# Patient Record
Sex: MALE | Race: ASIAN | NOT HISPANIC OR LATINO | URBAN - METROPOLITAN AREA
[De-identification: names, ages, dates, MRNs, and addresses within clinical notes are randomized per-mention and may not be internally consistent; named-entity substitution may affect disease eponyms.]

---

## 2017-11-24 ENCOUNTER — OUTPATIENT (OUTPATIENT)
Dept: OUTPATIENT SERVICES | Facility: HOSPITAL | Age: 52
LOS: 1 days | End: 2017-11-24
Payer: COMMERCIAL

## 2017-11-24 DIAGNOSIS — R07.9 CHEST PAIN, UNSPECIFIED: ICD-10-CM

## 2017-11-24 PROCEDURE — 78452 HT MUSCLE IMAGE SPECT MULT: CPT

## 2017-11-24 PROCEDURE — A9502: CPT

## 2017-11-24 PROCEDURE — 93017 CV STRESS TEST TRACING ONLY: CPT

## 2017-11-24 PROCEDURE — 93018 CV STRESS TEST I&R ONLY: CPT

## 2017-11-24 PROCEDURE — 78452 HT MUSCLE IMAGE SPECT MULT: CPT | Mod: 26

## 2017-11-24 PROCEDURE — 93016 CV STRESS TEST SUPVJ ONLY: CPT

## 2018-01-23 ENCOUNTER — APPOINTMENT (OUTPATIENT)
Dept: RADIOLOGY | Facility: IMAGING CENTER | Age: 53
End: 2018-01-23
Payer: COMMERCIAL

## 2018-01-23 ENCOUNTER — OUTPATIENT (OUTPATIENT)
Dept: OUTPATIENT SERVICES | Facility: HOSPITAL | Age: 53
LOS: 1 days | End: 2018-01-23
Payer: COMMERCIAL

## 2018-01-23 DIAGNOSIS — Z00.8 ENCOUNTER FOR OTHER GENERAL EXAMINATION: ICD-10-CM

## 2018-01-23 PROCEDURE — 73130 X-RAY EXAM OF HAND: CPT

## 2018-01-23 PROCEDURE — 73130 X-RAY EXAM OF HAND: CPT | Mod: 26,LT

## 2018-02-05 ENCOUNTER — APPOINTMENT (OUTPATIENT)
Dept: ORTHOPEDIC SURGERY | Facility: CLINIC | Age: 53
End: 2018-02-05

## 2018-05-24 ENCOUNTER — APPOINTMENT (OUTPATIENT)
Dept: VASCULAR SURGERY | Facility: CLINIC | Age: 53
End: 2018-05-24
Payer: COMMERCIAL

## 2018-05-24 VITALS
HEIGHT: 71 IN | HEART RATE: 97 BPM | TEMPERATURE: 98 F | BODY MASS INDEX: 35 KG/M2 | SYSTOLIC BLOOD PRESSURE: 131 MMHG | WEIGHT: 250 LBS | DIASTOLIC BLOOD PRESSURE: 90 MMHG

## 2018-05-24 PROCEDURE — 93970 EXTREMITY STUDY: CPT

## 2018-05-24 PROCEDURE — 99212 OFFICE O/P EST SF 10 MIN: CPT

## 2018-08-02 ENCOUNTER — APPOINTMENT (OUTPATIENT)
Dept: ORTHOPEDIC SURGERY | Facility: CLINIC | Age: 53
End: 2018-08-02
Payer: COMMERCIAL

## 2018-08-02 VITALS
BODY MASS INDEX: 35.29 KG/M2 | WEIGHT: 253 LBS | HEART RATE: 103 BPM | SYSTOLIC BLOOD PRESSURE: 131 MMHG | DIASTOLIC BLOOD PRESSURE: 89 MMHG

## 2018-08-02 PROCEDURE — 72170 X-RAY EXAM OF PELVIS: CPT

## 2018-08-02 PROCEDURE — 73562 X-RAY EXAM OF KNEE 3: CPT | Mod: 50

## 2018-08-02 PROCEDURE — 99203 OFFICE O/P NEW LOW 30 MIN: CPT

## 2018-10-17 ENCOUNTER — APPOINTMENT (OUTPATIENT)
Dept: RADIOLOGY | Facility: IMAGING CENTER | Age: 53
End: 2018-10-17
Payer: COMMERCIAL

## 2018-10-17 ENCOUNTER — OUTPATIENT (OUTPATIENT)
Dept: OUTPATIENT SERVICES | Facility: HOSPITAL | Age: 53
LOS: 1 days | End: 2018-10-17
Payer: COMMERCIAL

## 2018-10-17 DIAGNOSIS — Z00.8 ENCOUNTER FOR OTHER GENERAL EXAMINATION: ICD-10-CM

## 2018-10-17 PROCEDURE — 71046 X-RAY EXAM CHEST 2 VIEWS: CPT | Mod: 26

## 2018-10-17 PROCEDURE — 71046 X-RAY EXAM CHEST 2 VIEWS: CPT

## 2018-10-19 ENCOUNTER — EMERGENCY (EMERGENCY)
Facility: HOSPITAL | Age: 53
LOS: 1 days | Discharge: HOME | End: 2018-10-19
Attending: EMERGENCY MEDICINE | Admitting: EMERGENCY MEDICINE
Payer: COMMERCIAL

## 2018-10-19 VITALS
TEMPERATURE: 98 F | HEART RATE: 78 BPM | OXYGEN SATURATION: 100 % | RESPIRATION RATE: 16 BRPM | SYSTOLIC BLOOD PRESSURE: 143 MMHG | DIASTOLIC BLOOD PRESSURE: 80 MMHG

## 2018-10-19 PROCEDURE — 73564 X-RAY EXAM KNEE 4 OR MORE: CPT | Mod: 26,RT

## 2018-10-19 PROCEDURE — 99283 EMERGENCY DEPT VISIT LOW MDM: CPT

## 2018-10-19 RX ORDER — IBUPROFEN 200 MG
600 TABLET ORAL ONCE
Qty: 0 | Refills: 0 | Status: DISCONTINUED | OUTPATIENT
Start: 2018-10-19 | End: 2018-10-23
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
## 2018-11-15 ENCOUNTER — APPOINTMENT (OUTPATIENT)
Dept: VASCULAR SURGERY | Facility: CLINIC | Age: 53
End: 2018-11-15

## 2019-02-28 ENCOUNTER — APPOINTMENT (OUTPATIENT)
Dept: ULTRASOUND IMAGING | Facility: IMAGING CENTER | Age: 54
End: 2019-02-28
Payer: COMMERCIAL

## 2019-02-28 ENCOUNTER — OUTPATIENT (OUTPATIENT)
Dept: OUTPATIENT SERVICES | Facility: HOSPITAL | Age: 54
LOS: 1 days | End: 2019-02-28
Payer: COMMERCIAL

## 2019-02-28 DIAGNOSIS — Z00.8 ENCOUNTER FOR OTHER GENERAL EXAMINATION: ICD-10-CM

## 2019-02-28 PROCEDURE — 76705 ECHO EXAM OF ABDOMEN: CPT | Mod: 26,RT

## 2019-02-28 PROCEDURE — 76705 ECHO EXAM OF ABDOMEN: CPT

## 2019-08-07 ENCOUNTER — APPOINTMENT (OUTPATIENT)
Dept: ORTHOPEDIC SURGERY | Facility: CLINIC | Age: 54
End: 2019-08-07
Payer: COMMERCIAL

## 2019-08-07 DIAGNOSIS — M70.21 OLECRANON BURSITIS, RIGHT ELBOW: ICD-10-CM

## 2019-08-07 DIAGNOSIS — M25.729 OSTEOPHYTE, UNSPECIFIED ELBOW: ICD-10-CM

## 2019-08-07 PROCEDURE — 99213 OFFICE O/P EST LOW 20 MIN: CPT

## 2019-08-07 PROCEDURE — 73080 X-RAY EXAM OF ELBOW: CPT

## 2019-12-05 NOTE — ED PROVIDER NOTE - SKIN NEGATIVE STATEMENT, MLM
Influenza Vaccination/Heart Failure
no abrasions, no jaundice, no lesions, no pruritis, and no rashes.

## 2020-09-24 ENCOUNTER — APPOINTMENT (OUTPATIENT)
Dept: VASCULAR SURGERY | Facility: CLINIC | Age: 55
End: 2020-09-24
Payer: COMMERCIAL

## 2020-09-24 VITALS
WEIGHT: 242 LBS | SYSTOLIC BLOOD PRESSURE: 149 MMHG | DIASTOLIC BLOOD PRESSURE: 89 MMHG | BODY MASS INDEX: 34.65 KG/M2 | HEART RATE: 99 BPM | HEIGHT: 70 IN | TEMPERATURE: 98 F

## 2020-09-24 DIAGNOSIS — I87.2 VENOUS INSUFFICIENCY (CHRONIC) (PERIPHERAL): ICD-10-CM

## 2020-09-24 DIAGNOSIS — I83.93 ASYMPTOMATIC VARICOSE VEINS OF BILATERAL LOWER EXTREMITIES: ICD-10-CM

## 2020-09-24 DIAGNOSIS — S70.11XA CONTUSION OF RIGHT THIGH, INITIAL ENCOUNTER: ICD-10-CM

## 2020-09-24 PROCEDURE — 99213 OFFICE O/P EST LOW 20 MIN: CPT

## 2020-09-24 PROCEDURE — 93971 EXTREMITY STUDY: CPT

## 2020-09-24 NOTE — DATA REVIEWED
[FreeTextEntry1] : 9/24/20 Venous Doppler: Right leg negative DVT/SVT. GSV reflux SFJ-Prox thigh, prox-mid GSV ablated, GSV reflux in mid-distal calf and tribs in calf. No hematoma or fluid collection noted.

## 2020-09-24 NOTE — ASSESSMENT
[Arterial/Venous Disease] : arterial/venous disease [Other: _____] : [unfilled] [FreeTextEntry1] : 56 y/o m w/ VI s/p previous vein procedures w/ right posterior thigh and calf ecchymosis, provoked d/t trauma. Venous doppler negative for DVT or hematoma or any collections. Bruising is likely prolonged d/t ASA 81mg. He is asymptomatic in regards to his varicose veins at this time. No intervention recommended.\par \par Medical Conservative Management diet/weight loss and exercise and compression stockings\par Rx given for 20-30mmhg knee high comp stockings\par RTO on an as needed basis or if new s/s develop

## 2020-09-24 NOTE — HISTORY OF PRESENT ILLNESS
[FreeTextEntry1] : 56 y/o m w/ history of DM, HTN, Right leg DVT, VI s/p left leg stripping >10 yrs ago and right GSV EVLT in 2015. Pt reports approx 7-10 days ago he pulled his right hamstring and subsequently developed a bruise on the back of his thigh and calf. As per pt., each day the bruise is improving and he is no longer tender in the area. He is concerned for a new clot. Denies swelling. Denies any other trauma or injury. Denies fever or chills. Has not worn compression stockings. Take ASA 81mg daily.

## 2020-09-24 NOTE — PHYSICAL EXAM
[No Rash or Lesion] : No rash or lesion [Alert] : alert [Oriented to Person] : oriented to person [Oriented to Place] : oriented to place [Oriented to Time] : oriented to time [Calm] : calm [2+] : left 2+ [de-identified] : well in nad  [FreeTextEntry1] : Bilateral lower extremities warm with intact skin. Right posterior distal thigh and posterior proximal calf w/ ecchymosis. Flat, no warmth or induration. Non tender. Bilateral mild venous stasis chronic skin changes in the gaiter areas, with asymptomatic varicosities in the right calf, left medial thigh and calf. Left leg is affected more than right. No edema or erythema. CEAP C4a [de-identified] : DAIJAL [de-identified] : Cooperative

## 2022-08-18 ENCOUNTER — APPOINTMENT (OUTPATIENT)
Dept: ORTHOPEDIC SURGERY | Facility: CLINIC | Age: 57
End: 2022-08-18

## 2022-08-18 DIAGNOSIS — M17.12 UNILATERAL PRIMARY OSTEOARTHRITIS, LEFT KNEE: ICD-10-CM

## 2022-08-18 DIAGNOSIS — M17.11 UNILATERAL PRIMARY OSTEOARTHRITIS, RIGHT KNEE: ICD-10-CM

## 2022-08-18 PROCEDURE — 73562 X-RAY EXAM OF KNEE 3: CPT | Mod: 50

## 2022-08-18 PROCEDURE — 99215 OFFICE O/P EST HI 40 MIN: CPT

## 2023-05-24 ENCOUNTER — APPOINTMENT (OUTPATIENT)
Dept: VASCULAR SURGERY | Facility: CLINIC | Age: 58
End: 2023-05-24
Payer: COMMERCIAL

## 2023-05-24 VITALS
SYSTOLIC BLOOD PRESSURE: 146 MMHG | DIASTOLIC BLOOD PRESSURE: 87 MMHG | BODY MASS INDEX: 32.93 KG/M2 | WEIGHT: 230 LBS | HEART RATE: 75 BPM | HEIGHT: 70 IN | TEMPERATURE: 97.6 F

## 2023-05-24 PROCEDURE — 93970 EXTREMITY STUDY: CPT

## 2023-05-24 PROCEDURE — 99213 OFFICE O/P EST LOW 20 MIN: CPT

## 2023-05-24 NOTE — HISTORY OF PRESENT ILLNESS
[FreeTextEntry1] : 54 y/o m w/ history of DM, HTN, Right leg DVT, VI s/p left leg stripping >10 yrs ago and right GSV EVLT in 2015. Presented with new complaint of left upper calf pain and varicosity for the past few months. Denies swelling. Denies any other trauma or injury. Denies fever or chills. Has not worn compression stockings. Take ASA 81mg daily. \par

## 2023-05-24 NOTE — CONSULT LETTER
[Dear  ___] : Dear  [unfilled], [Consult Letter:] : I had the pleasure of evaluating your patient, [unfilled]. [Consult Closing:] : Thank you very much for allowing me to participate in the care of this patient.  If you have any questions, please do not hesitate to contact me. [Sincerely,] : Sincerely, [FreeTextEntry3] : Renée Majano MD, FSVS, FACS\par Associate Chief, Vascular & Endovascular Surgery\par , Vascular Surgery Fellowship & Residency \par Associate Professor of Surgery,\par [facility] [employer] [facility]\par \par Division of Vascular & Endovascular Surgery\par [facility]\par [address]\[place] [address]\par Tel: [phone]\par \par

## 2023-05-24 NOTE — PHYSICAL EXAM
[2+] : left 2+ [Ankle Swelling Bilaterally] : bilaterally  [Varicose Veins Of Lower Extremities] : bilaterally [Ankle Swelling (On Exam)] : not present [] : not present [de-identified] : NAD

## 2023-05-24 NOTE — ASSESSMENT
[FreeTextEntry1] : 57M hx of bilateral venous insuff s/p left GSV stripping > 10 years ago and right EVLT in 2015, here with new left calf pain. CEAP C2 on exam. Does not wear compression stockings.\par \par Duplex shows no DVT, no SVT, bl  focal varicosities and isolated calf reflux on the R\par \par Plan:\par - compression stocking knee high 20-30mmhg\par - leg elevation when sitting\par - stab phlebectomy discussed, patient does not wish to proceed at this time\par - follow up as needed\par

## 2023-11-14 ENCOUNTER — TRANSCRIPTION ENCOUNTER (OUTPATIENT)
Age: 58
End: 2023-11-14

## 2023-11-14 ENCOUNTER — INPATIENT (INPATIENT)
Facility: HOSPITAL | Age: 58
LOS: 0 days | Discharge: HOME | End: 2023-11-14
Attending: INTERNAL MEDICINE | Admitting: INTERNAL MEDICINE
Payer: COMMERCIAL

## 2023-11-14 VITALS
HEART RATE: 63 BPM | OXYGEN SATURATION: 100 % | DIASTOLIC BLOOD PRESSURE: 77 MMHG | TEMPERATURE: 98 F | RESPIRATION RATE: 15 BRPM | SYSTOLIC BLOOD PRESSURE: 152 MMHG

## 2023-11-14 DIAGNOSIS — R07.89 OTHER CHEST PAIN: ICD-10-CM

## 2023-11-14 LAB
ALBUMIN SERPL ELPH-MCNC: 4.1 G/DL — SIGNIFICANT CHANGE UP (ref 3.3–5)
ALBUMIN SERPL ELPH-MCNC: 4.1 G/DL — SIGNIFICANT CHANGE UP (ref 3.3–5)
ALP SERPL-CCNC: 64 U/L — SIGNIFICANT CHANGE UP (ref 40–120)
ALP SERPL-CCNC: 64 U/L — SIGNIFICANT CHANGE UP (ref 40–120)
ALT FLD-CCNC: 24 U/L — SIGNIFICANT CHANGE UP (ref 4–41)
ALT FLD-CCNC: 24 U/L — SIGNIFICANT CHANGE UP (ref 4–41)
ANION GAP SERPL CALC-SCNC: 12 MMOL/L — SIGNIFICANT CHANGE UP (ref 7–14)
ANION GAP SERPL CALC-SCNC: 12 MMOL/L — SIGNIFICANT CHANGE UP (ref 7–14)
AST SERPL-CCNC: 23 U/L — SIGNIFICANT CHANGE UP (ref 4–40)
AST SERPL-CCNC: 23 U/L — SIGNIFICANT CHANGE UP (ref 4–40)
BASOPHILS # BLD AUTO: 0.09 K/UL — SIGNIFICANT CHANGE UP (ref 0–0.2)
BASOPHILS # BLD AUTO: 0.09 K/UL — SIGNIFICANT CHANGE UP (ref 0–0.2)
BASOPHILS NFR BLD AUTO: 1 % — SIGNIFICANT CHANGE UP (ref 0–2)
BASOPHILS NFR BLD AUTO: 1 % — SIGNIFICANT CHANGE UP (ref 0–2)
BILIRUB SERPL-MCNC: 0.3 MG/DL — SIGNIFICANT CHANGE UP (ref 0.2–1.2)
BILIRUB SERPL-MCNC: 0.3 MG/DL — SIGNIFICANT CHANGE UP (ref 0.2–1.2)
BUN SERPL-MCNC: 13 MG/DL — SIGNIFICANT CHANGE UP (ref 7–23)
BUN SERPL-MCNC: 13 MG/DL — SIGNIFICANT CHANGE UP (ref 7–23)
CALCIUM SERPL-MCNC: 8.9 MG/DL — SIGNIFICANT CHANGE UP (ref 8.4–10.5)
CALCIUM SERPL-MCNC: 8.9 MG/DL — SIGNIFICANT CHANGE UP (ref 8.4–10.5)
CHLORIDE SERPL-SCNC: 100 MMOL/L — SIGNIFICANT CHANGE UP (ref 98–107)
CHLORIDE SERPL-SCNC: 100 MMOL/L — SIGNIFICANT CHANGE UP (ref 98–107)
CO2 SERPL-SCNC: 24 MMOL/L — SIGNIFICANT CHANGE UP (ref 22–31)
CO2 SERPL-SCNC: 24 MMOL/L — SIGNIFICANT CHANGE UP (ref 22–31)
CREAT SERPL-MCNC: 0.62 MG/DL — SIGNIFICANT CHANGE UP (ref 0.5–1.3)
CREAT SERPL-MCNC: 0.62 MG/DL — SIGNIFICANT CHANGE UP (ref 0.5–1.3)
EGFR: 111 ML/MIN/1.73M2 — SIGNIFICANT CHANGE UP
EGFR: 111 ML/MIN/1.73M2 — SIGNIFICANT CHANGE UP
EOSINOPHIL # BLD AUTO: 0.71 K/UL — HIGH (ref 0–0.5)
EOSINOPHIL # BLD AUTO: 0.71 K/UL — HIGH (ref 0–0.5)
EOSINOPHIL NFR BLD AUTO: 7.7 % — HIGH (ref 0–6)
EOSINOPHIL NFR BLD AUTO: 7.7 % — HIGH (ref 0–6)
GLUCOSE BLDC GLUCOMTR-MCNC: 115 MG/DL — HIGH (ref 70–99)
GLUCOSE BLDC GLUCOMTR-MCNC: 115 MG/DL — HIGH (ref 70–99)
GLUCOSE SERPL-MCNC: 193 MG/DL — HIGH (ref 70–99)
GLUCOSE SERPL-MCNC: 193 MG/DL — HIGH (ref 70–99)
HCT VFR BLD CALC: 44.9 % — SIGNIFICANT CHANGE UP (ref 39–50)
HCT VFR BLD CALC: 44.9 % — SIGNIFICANT CHANGE UP (ref 39–50)
HGB BLD-MCNC: 15 G/DL — SIGNIFICANT CHANGE UP (ref 13–17)
HGB BLD-MCNC: 15 G/DL — SIGNIFICANT CHANGE UP (ref 13–17)
IANC: 4.82 K/UL — SIGNIFICANT CHANGE UP (ref 1.8–7.4)
IANC: 4.82 K/UL — SIGNIFICANT CHANGE UP (ref 1.8–7.4)
IMM GRANULOCYTES NFR BLD AUTO: 0.2 % — SIGNIFICANT CHANGE UP (ref 0–0.9)
IMM GRANULOCYTES NFR BLD AUTO: 0.2 % — SIGNIFICANT CHANGE UP (ref 0–0.9)
LYMPHOCYTES # BLD AUTO: 2.8 K/UL — SIGNIFICANT CHANGE UP (ref 1–3.3)
LYMPHOCYTES # BLD AUTO: 2.8 K/UL — SIGNIFICANT CHANGE UP (ref 1–3.3)
LYMPHOCYTES # BLD AUTO: 30.5 % — SIGNIFICANT CHANGE UP (ref 13–44)
LYMPHOCYTES # BLD AUTO: 30.5 % — SIGNIFICANT CHANGE UP (ref 13–44)
MCHC RBC-ENTMCNC: 29.8 PG — SIGNIFICANT CHANGE UP (ref 27–34)
MCHC RBC-ENTMCNC: 29.8 PG — SIGNIFICANT CHANGE UP (ref 27–34)
MCHC RBC-ENTMCNC: 33.4 GM/DL — SIGNIFICANT CHANGE UP (ref 32–36)
MCHC RBC-ENTMCNC: 33.4 GM/DL — SIGNIFICANT CHANGE UP (ref 32–36)
MCV RBC AUTO: 89.1 FL — SIGNIFICANT CHANGE UP (ref 80–100)
MCV RBC AUTO: 89.1 FL — SIGNIFICANT CHANGE UP (ref 80–100)
MONOCYTES # BLD AUTO: 0.74 K/UL — SIGNIFICANT CHANGE UP (ref 0–0.9)
MONOCYTES # BLD AUTO: 0.74 K/UL — SIGNIFICANT CHANGE UP (ref 0–0.9)
MONOCYTES NFR BLD AUTO: 8.1 % — SIGNIFICANT CHANGE UP (ref 2–14)
MONOCYTES NFR BLD AUTO: 8.1 % — SIGNIFICANT CHANGE UP (ref 2–14)
NEUTROPHILS # BLD AUTO: 4.82 K/UL — SIGNIFICANT CHANGE UP (ref 1.8–7.4)
NEUTROPHILS # BLD AUTO: 4.82 K/UL — SIGNIFICANT CHANGE UP (ref 1.8–7.4)
NEUTROPHILS NFR BLD AUTO: 52.5 % — SIGNIFICANT CHANGE UP (ref 43–77)
NEUTROPHILS NFR BLD AUTO: 52.5 % — SIGNIFICANT CHANGE UP (ref 43–77)
NRBC # BLD: 0 /100 WBCS — SIGNIFICANT CHANGE UP (ref 0–0)
NRBC # BLD: 0 /100 WBCS — SIGNIFICANT CHANGE UP (ref 0–0)
NRBC # FLD: 0 K/UL — SIGNIFICANT CHANGE UP (ref 0–0)
NRBC # FLD: 0 K/UL — SIGNIFICANT CHANGE UP (ref 0–0)
PLATELET # BLD AUTO: 243 K/UL — SIGNIFICANT CHANGE UP (ref 150–400)
PLATELET # BLD AUTO: 243 K/UL — SIGNIFICANT CHANGE UP (ref 150–400)
POTASSIUM SERPL-MCNC: 4.1 MMOL/L — SIGNIFICANT CHANGE UP (ref 3.5–5.3)
POTASSIUM SERPL-MCNC: 4.1 MMOL/L — SIGNIFICANT CHANGE UP (ref 3.5–5.3)
POTASSIUM SERPL-SCNC: 4.1 MMOL/L — SIGNIFICANT CHANGE UP (ref 3.5–5.3)
POTASSIUM SERPL-SCNC: 4.1 MMOL/L — SIGNIFICANT CHANGE UP (ref 3.5–5.3)
PROT SERPL-MCNC: 6.9 G/DL — SIGNIFICANT CHANGE UP (ref 6–8.3)
PROT SERPL-MCNC: 6.9 G/DL — SIGNIFICANT CHANGE UP (ref 6–8.3)
RBC # BLD: 5.04 M/UL — SIGNIFICANT CHANGE UP (ref 4.2–5.8)
RBC # BLD: 5.04 M/UL — SIGNIFICANT CHANGE UP (ref 4.2–5.8)
RBC # FLD: 13.2 % — SIGNIFICANT CHANGE UP (ref 10.3–14.5)
RBC # FLD: 13.2 % — SIGNIFICANT CHANGE UP (ref 10.3–14.5)
SODIUM SERPL-SCNC: 136 MMOL/L — SIGNIFICANT CHANGE UP (ref 135–145)
SODIUM SERPL-SCNC: 136 MMOL/L — SIGNIFICANT CHANGE UP (ref 135–145)
TROPONIN T, HIGH SENSITIVITY RESULT: 14 NG/L — SIGNIFICANT CHANGE UP
TROPONIN T, HIGH SENSITIVITY RESULT: 14 NG/L — SIGNIFICANT CHANGE UP
WBC # BLD: 9.18 K/UL — SIGNIFICANT CHANGE UP (ref 3.8–10.5)
WBC # BLD: 9.18 K/UL — SIGNIFICANT CHANGE UP (ref 3.8–10.5)
WBC # FLD AUTO: 9.18 K/UL — SIGNIFICANT CHANGE UP (ref 3.8–10.5)
WBC # FLD AUTO: 9.18 K/UL — SIGNIFICANT CHANGE UP (ref 3.8–10.5)

## 2023-11-14 PROCEDURE — 99285 EMERGENCY DEPT VISIT HI MDM: CPT

## 2023-11-14 PROCEDURE — 92928 PRQ TCAT PLMT NTRAC ST 1 LES: CPT | Mod: LC

## 2023-11-14 PROCEDURE — 92978 ENDOLUMINL IVUS OCT C 1ST: CPT | Mod: 26,LC

## 2023-11-14 PROCEDURE — 71046 X-RAY EXAM CHEST 2 VIEWS: CPT | Mod: 26

## 2023-11-14 PROCEDURE — 93454 CORONARY ARTERY ANGIO S&I: CPT | Mod: 26,59

## 2023-11-14 RX ORDER — ASPIRIN/CALCIUM CARB/MAGNESIUM 324 MG
81 TABLET ORAL DAILY
Refills: 0 | Status: DISCONTINUED | OUTPATIENT
Start: 2023-11-14 | End: 2023-11-14

## 2023-11-14 RX ORDER — INSULIN LISPRO 100/ML
VIAL (ML) SUBCUTANEOUS
Refills: 0 | Status: DISCONTINUED | OUTPATIENT
Start: 2023-11-14 | End: 2023-11-14

## 2023-11-14 RX ORDER — ACETAMINOPHEN 500 MG
650 TABLET ORAL ONCE
Refills: 0 | Status: COMPLETED | OUTPATIENT
Start: 2023-11-14 | End: 2023-11-14

## 2023-11-14 RX ORDER — LOSARTAN POTASSIUM 100 MG/1
100 TABLET, FILM COATED ORAL DAILY
Refills: 0 | Status: DISCONTINUED | OUTPATIENT
Start: 2023-11-14 | End: 2023-11-14

## 2023-11-14 RX ORDER — DEXTROSE 50 % IN WATER 50 %
25 SYRINGE (ML) INTRAVENOUS ONCE
Refills: 0 | Status: DISCONTINUED | OUTPATIENT
Start: 2023-11-14 | End: 2023-11-14

## 2023-11-14 RX ORDER — SODIUM CHLORIDE 9 MG/ML
1000 INJECTION, SOLUTION INTRAVENOUS
Refills: 0 | Status: DISCONTINUED | OUTPATIENT
Start: 2023-11-14 | End: 2023-11-14

## 2023-11-14 RX ORDER — METFORMIN HYDROCHLORIDE 850 MG/1
1 TABLET ORAL
Qty: 0 | Refills: 0 | DISCHARGE

## 2023-11-14 RX ORDER — AMLODIPINE BESYLATE 2.5 MG/1
1 TABLET ORAL
Refills: 0 | DISCHARGE

## 2023-11-14 RX ORDER — METRONIDAZOLE 500 MG
250 TABLET ORAL ONCE
Refills: 0 | Status: COMPLETED | OUTPATIENT
Start: 2023-11-14 | End: 2023-11-14

## 2023-11-14 RX ORDER — OMEPRAZOLE 10 MG/1
1 CAPSULE, DELAYED RELEASE ORAL
Refills: 0 | DISCHARGE

## 2023-11-14 RX ORDER — ASCORBIC ACID 60 MG
0 TABLET,CHEWABLE ORAL
Refills: 0 | DISCHARGE

## 2023-11-14 RX ORDER — DEXTROSE 50 % IN WATER 50 %
15 SYRINGE (ML) INTRAVENOUS ONCE
Refills: 0 | Status: DISCONTINUED | OUTPATIENT
Start: 2023-11-14 | End: 2023-11-14

## 2023-11-14 RX ORDER — ESCITALOPRAM OXALATE 10 MG/1
1 TABLET, FILM COATED ORAL
Refills: 0 | DISCHARGE

## 2023-11-14 RX ORDER — ATORVASTATIN CALCIUM 80 MG/1
1 TABLET, FILM COATED ORAL
Refills: 0 | DISCHARGE

## 2023-11-14 RX ORDER — CLOPIDOGREL BISULFATE 75 MG/1
1 TABLET, FILM COATED ORAL
Qty: 30 | Refills: 3
Start: 2023-11-14 | End: 2024-03-12

## 2023-11-14 RX ORDER — CLOPIDOGREL BISULFATE 75 MG/1
75 TABLET, FILM COATED ORAL DAILY
Refills: 0 | Status: DISCONTINUED | OUTPATIENT
Start: 2023-11-15 | End: 2023-11-14

## 2023-11-14 RX ORDER — METRONIDAZOLE 500 MG
1 TABLET ORAL
Refills: 0 | DISCHARGE

## 2023-11-14 RX ORDER — DEXTROSE 50 % IN WATER 50 %
12.5 SYRINGE (ML) INTRAVENOUS ONCE
Refills: 0 | Status: DISCONTINUED | OUTPATIENT
Start: 2023-11-14 | End: 2023-11-14

## 2023-11-14 RX ORDER — LOSARTAN/HYDROCHLOROTHIAZIDE 100MG-25MG
1 TABLET ORAL
Refills: 0 | DISCHARGE

## 2023-11-14 RX ORDER — GLUCAGON INJECTION, SOLUTION 0.5 MG/.1ML
1 INJECTION, SOLUTION SUBCUTANEOUS ONCE
Refills: 0 | Status: DISCONTINUED | OUTPATIENT
Start: 2023-11-14 | End: 2023-11-14

## 2023-11-14 RX ORDER — ATORVASTATIN CALCIUM 80 MG/1
20 TABLET, FILM COATED ORAL AT BEDTIME
Refills: 0 | Status: DISCONTINUED | OUTPATIENT
Start: 2023-11-14 | End: 2023-11-14

## 2023-11-14 RX ORDER — AMLODIPINE BESYLATE 2.5 MG/1
5 TABLET ORAL DAILY
Refills: 0 | Status: DISCONTINUED | OUTPATIENT
Start: 2023-11-14 | End: 2023-11-14

## 2023-11-14 RX ORDER — ASPIRIN/CALCIUM CARB/MAGNESIUM 324 MG
1 TABLET ORAL
Qty: 0 | Refills: 0 | DISCHARGE

## 2023-11-14 RX ORDER — PREGABALIN 225 MG/1
0 CAPSULE ORAL
Refills: 0 | DISCHARGE

## 2023-11-14 RX ORDER — ESCITALOPRAM OXALATE 10 MG/1
5 TABLET, FILM COATED ORAL DAILY
Refills: 0 | Status: DISCONTINUED | OUTPATIENT
Start: 2023-11-14 | End: 2023-11-14

## 2023-11-14 RX ORDER — ATORVASTATIN CALCIUM 80 MG/1
1 TABLET, FILM COATED ORAL
Qty: 30 | Refills: 3
Start: 2023-11-14 | End: 2024-03-12

## 2023-11-14 RX ORDER — ASPIRIN/CALCIUM CARB/MAGNESIUM 324 MG
1 TABLET ORAL
Qty: 30 | Refills: 0
Start: 2023-11-14 | End: 2023-12-13

## 2023-11-14 RX ORDER — PANTOPRAZOLE SODIUM 20 MG/1
40 TABLET, DELAYED RELEASE ORAL
Refills: 0 | Status: DISCONTINUED | OUTPATIENT
Start: 2023-11-14 | End: 2023-11-14

## 2023-11-14 RX ORDER — ACETAMINOPHEN 500 MG
1000 TABLET ORAL ONCE
Refills: 0 | Status: COMPLETED | OUTPATIENT
Start: 2023-11-14 | End: 2023-11-14

## 2023-11-14 RX ORDER — LIDOCAINE 4 G/100G
1 CREAM TOPICAL ONCE
Refills: 0 | Status: COMPLETED | OUTPATIENT
Start: 2023-11-14 | End: 2023-11-14

## 2023-11-14 RX ORDER — ATORVASTATIN CALCIUM 80 MG/1
1 TABLET, FILM COATED ORAL
Qty: 30 | Refills: 0
Start: 2023-11-14 | End: 2023-12-13

## 2023-11-14 RX ORDER — CLOPIDOGREL BISULFATE 75 MG/1
1 TABLET, FILM COATED ORAL
Qty: 30 | Refills: 0
Start: 2023-11-14 | End: 2023-12-13

## 2023-11-14 RX ADMIN — LIDOCAINE 1 PATCH: 4 CREAM TOPICAL at 09:54

## 2023-11-14 RX ADMIN — Medication 650 MILLIGRAM(S): at 15:40

## 2023-11-14 RX ADMIN — Medication 250 MILLIGRAM(S): at 15:46

## 2023-11-14 RX ADMIN — Medication 400 MILLIGRAM(S): at 08:52

## 2023-11-14 RX ADMIN — Medication 650 MILLIGRAM(S): at 15:52

## 2023-11-14 RX ADMIN — Medication 500 MILLIGRAM(S): at 15:48

## 2023-11-14 NOTE — DISCHARGE NOTE NURSING/CASE MANAGEMENT/SOCIAL WORK - NSDCPEFALRISK_GEN_ALL_CORE
For information on Fall & Injury Prevention, visit: [email or web address] OR  [email or web address] OR  https://www.cdc.gov/steadi/patient.html

## 2023-11-14 NOTE — CONSULT NOTE ADULT - TIME BILLING
- Review of records, telemetry, vital signs and daily labs.   - General and cardiovascular physical examination.  - Generation of cardiovascular treatment plan.  - Coordination of care.      Patient was seen and examined by me on  11/14/2023,interim events noted,labs and radiology studies reviewed.  Mike White MD,FACC.  [address] [place], [place] [id number].  [phone] - Review of records, telemetry, vital signs and daily labs.   - General and cardiovascular physical examination.  - Generation of cardiovascular treatment plan.  - Coordination of care.  -Attending supervision statement: I have personally seen and examined the patient.      I reviewed the overnight course of events on the unit, re-confirming the patient history.  I have reviewed interim Lab results,Radiology and Cardiac studies.   I discussed the care with the patient and available  family.    Differential diagnosis and plan of care discussed with patient after the evaluation.   Advanced care planning was discussed .    Smoking cessation was adviced  Risks, benefits and alternatives of scheduled procedures were discussed in detail and all questions were answered.   45 minutes spent on total encounter of which more than fifty percent of the encounter was spent counseling and/or coordinating care by the attending physician.  [facility] Health Code 15076             Patient was seen and examined by me on  11/14/2023,interim events noted,labs and radiology studies reviewed.  Mike White MD,FACC.  [id number] [id number].  [phone]

## 2023-11-14 NOTE — ED ADULT TRIAGE NOTE - CHIEF COMPLAINT QUOTE
C/o left arm pain x1 day. after taking BP at cardiologist. No complaints of chest pain, headache, nausea, dizziness, vomiting  SOB, fever, chills verbalized.. Phx HTN  HLD DM2( no insulin pump)

## 2023-11-14 NOTE — ED PROVIDER NOTE - CARE PLAN
1 Principal Discharge DX:	Atypical chest pain   Principal Discharge DX:	Atypical chest pain  Secondary Diagnosis:	Shoulder pain, left

## 2023-11-14 NOTE — H&P ADULT - HISTORY OF PRESENT ILLNESS
50-year-old male with history of HTN, diabetes on metformin presents to the ED with left arm pain X 1 day.  Patient says that the left arm pain is radiating up to his shoulder his chest and back.  Patient denies any N/V, shortness of breath, palpitation, fevers, cough, chills.  Patient had an elevated CT calcium score yesterday and was told by his cardiologist, Dr. Mike White to come in to get a cardiac cath.  NKDA.  Patient endorses cigarette use.

## 2023-11-14 NOTE — DISCHARGE NOTE PROVIDER - NSDCMRMEDTOKEN_GEN_ALL_CORE_FT
amLODIPine 5 mg oral tablet: 1 tab(s) orally once a day  aspirin 81 mg oral tablet: 1 tab(s) orally once a day  clopidogrel 75 mg oral tablet: 1 tab(s) orally once a day  escitalopram 5 mg oral tablet: 1 tab(s) orally once a day  Lipitor 40 mg oral tablet: 1 tab(s) orally once a day  LORazepam 0.5 mg oral tablet: 1 tab(s) orally once a day (at bedtime)  losartan-hydroCHLOROthiazide 100 mg-12.5 mg oral tablet: 1 tab(s) orally once a day  metFORMIN 1000 mg oral tablet: 1 tab(s) orally 2 times a day  metroNIDAZOLE 250 mg oral tablet: 1 tab(s) orally 4 times a day  omeprazole 20 mg oral delayed release tablet: 1 tab(s) orally 2 times a day  tetracycline 500 mg oral tablet: orally 4 times a day  Vitamin B12:   Vitamin C:

## 2023-11-14 NOTE — ED PROVIDER NOTE - PHYSICAL EXAMINATION
Const: Awake, alert, no acute distress.  Well appearing.  Moving comfortably on stretcher.  HEENT: NC/AT.  Moist mucous membranes.  No pharyngeal erythema, no exudates.  Cardiac: Regular rate and irregular rhythm. S1 S2 present.  Peripheral pulses 2+ and symmetric. No LE edema.  Resp: Speaking in full sentences. No evidence of respiratory distress.  Breath sounds clear to auscultation b/l. Normal chest excursion.   Abd: Non-distended, no overlying skin changes.  Soft, non-tender, no guarding, no rigidity, no rebound tenderness.  No palpable masses.  Normal bowel sounds in all 4 quadrants.  Skin: Normal coloration.  No rashes, abrasions or lacerations.  Neuro: Awake, alert & oriented x 3.  Moves all extremities spontaneously.  No focal deficits.

## 2023-11-14 NOTE — H&P ADULT - REASON FOR ADMISSION
The patient is a 58y Male complaining of arm pain/injury. The patient is a 58y Male complaining of Lt arm pain

## 2023-11-14 NOTE — DISCHARGE NOTE PROVIDER - NSDCCPCAREPLAN_GEN_ALL_CORE_FT
PRINCIPAL DISCHARGE DIAGNOSIS  Diagnosis: Atypical chest pain  Assessment and Plan of Treatment: 11/14 Left Heart Cath: LAD 40-50%, dLCx 30%, pOM 70%=> stent x1, mRCA 40-50%. Right radial (wrist) access. Aspirin + Plaivx.   follow up with cardiologist in 1 week.      SECONDARY DISCHARGE DIAGNOSES  Diagnosis: Shoulder pain, left  Assessment and Plan of Treatment:

## 2023-11-14 NOTE — ED PROVIDER NOTE - PROGRESS NOTE DETAILS
Pt discussed with Dr. White. Pt will be schedule for a cath today. Pt has been endorsed to Dr. Rojas by Dr. White.

## 2023-11-14 NOTE — DISCHARGE NOTE PROVIDER - HOSPITAL COURSE
50-year-old male with history of HTN, diabetes on metformin presents to the ED with left arm pain X 1 day.  Patient says that the left arm pain is radiating up to his shoulder his chest and back.  Patient denies any N/V, shortness of breath, palpitation, fevers, cough, chills.  Patient had an elevated CT calcium score yesterday and was told by his cardiologist, Dr. Mike White to come in to get a cardiac cath.  NKDA.  Patient endorses cigarette use.    11/14 LHC: LAD 40-50%, dLCx 30%, pOM 70%=> stent x1, mRCA 40-50%. RRA access. ASA + Plaivx.     stable for discharge home.

## 2023-11-14 NOTE — ED ADULT NURSE NOTE - NSFALLUNIVINTERV_ED_ALL_ED
Bed/Stretcher in lowest position, wheels locked, appropriate side rails in place/Call bell, personal items and telephone in reach/Instruct patient to call for assistance before getting out of bed/chair/stretcher/Non-slip footwear applied when patient is off stretcher/[place] to call system/Physically safe environment - no spills, clutter or unnecessary equipment/Purposeful proactive rounding/Room/bathroom lighting operational, light cord in reach

## 2023-11-14 NOTE — ED PROVIDER NOTE - CLINICAL SUMMARY MEDICAL DECISION MAKING FREE TEXT BOX
50-year-old male with history of HTN, diabetes on metformin presents to the ED with left arm pain X 1 day.  Patient says that the left arm pain is radiating up to his shoulder his chest and back.  Patient denies any N/V, shortness of breath, palpitation, fevers, cough, chills.  Patient had an elevated CT calcium score yesterday and was told by his cardiologist, Dr. Mike White to come in to get a cardiac cath.  NKDA.  Patient endorses cigarette use.    Vitals WNL.  Glucose elevated to the 200s.  DDx includes ACS, arrhythmia, MSK.  Orders include EKG, chest x-ray, labs and troponin.  Will call Dr. White to discuss pt dispo.

## 2023-11-14 NOTE — H&P ADULT - NSICDXFAMILYHX_GEN_ALL_CORE_FT
FAMILY HISTORY:  Father  Still living? Unknown  Family history of deep venous thrombosis, Age at diagnosis: Age Unknown

## 2023-11-14 NOTE — ED PROVIDER NOTE - WR ORDER DATE AND TIME 1
Patient &O x4, no c/o pain, lungs clear/diminished. Patient needed O2 overnight, nurse spoke briefly with patient about SARIAH, patient de-sats terribly while sleeping. Patient's bulb drain having decent output, serosanguinous output. 14-Nov-2023 08:33

## 2023-11-14 NOTE — DISCHARGE NOTE NURSING/CASE MANAGEMENT/SOCIAL WORK - PATIENT PORTAL LINK FT
You can access the FollowMyHealth Patient Portal offered by [facility] by registering at the following website: [email or web address]. By joining [facility]’s FollowMyHealth portal, you will also be able to view your health information using other applications (apps) compatible with our system.

## 2023-11-14 NOTE — CONSULT NOTE ADULT - SUBJECTIVE AND OBJECTIVE BOX
MR:- [id number] :  NAME:-MANJINDER SKELTON:-    DATE OF SERVICE:11-14-23 @ 09:20    Patient was seen,examined and evaluated  by Mike White MD [id number] @ 09:20 .  ER evaluation, Labs and Hospital course was reviewed,    CHIEF COMPLAINT:LUE pain    HPI:50-year-old male with history of HTN, diabetes on metformin presents to the ED with left arm pain X 1 day.  Patient says that the left arm pain is radiating up to his shoulder his chest and back.  Patient denies any N/V, shortness of breath, palpitation, fevers, cough, chills.    Patient had an elevated CT calcium score yesterday and was told by his cardiologist, Dr. Mike White to come in to get a cardiac cath.  Hx Tabacco use    CARDIAC HISTORY:  [ ] CAD [ [PCI [ ] CABG [ ] Prior Cath  [ ] Atrial Fibrillation  Devices[ ] PPM [ ] ICD [ ]ILR  Heart Failure [ ] HFrEF [ ] HFpEF    PAST MEDICAL & SURGICAL HISTORY:  Hypertension      Diabetes      Varicose veins      H/O nose disorder      Varicose veins of lower extremity with pain          MEDICATIONS  (STANDING):  lidocaine   4% Patch 1 Patch Transdermal Once      FAMILY HISTORY:  Family history of deep venous thrombosis (Father)      No family history of premature coronary artery disease or sudden cardiac death    SOCIAL HISTORY:  Smoking-[x ] Active  [ ] Former [ ] Non Smoker  Alcohol-[ ] Denies [x ] Social [ ] Daily  Ilicit Drug use-[x ] Denies [ ] Active user    REVIEW OF SYSTEMS:  Constitutional: [ ] fever, [ ]weight loss, [ ]fatigue   Activity [ ] Bedbound,[x ] Ambulates [x ] Unassisted[ ] Cane/Walker [ ] Assistence.  Effort tolerance:[ ] Excellent [ ] Good [x ] Fair [ ] Poor [ ]  Eyes: [ ] visual changes  Respiratory: [ ]shortness of breath;  [ ] cough, [ ]wheezing, [ ]chills, [ ]hemoptysis  Cardiovascular: [ ] chest pain, [ ]palpitations, [ ]dizziness,  [ ]leg swelling[ ]orthopnea [ ]PND  Gastrointestinal: [ ] abdominal pain, [ ]nausea, [ ]vomiting,  [ ]diarrhea,[ ]constipation  Genitourinary: [ ] dysuria, [ ] hematuria  Neurologic: [ ] headaches [ ] tremors[ ] weakness  Skin: [ ] itching, [ ]burning, [ ] rashes  Endocrine: [ ] heat or cold intolerance  Musculoskeletal: [ ] joint pain or swelling; [ ] muscle, back, or extremity pain  Psychiatric: [ ] depression, [ ]anxiety, [ ]mood swings, or [ ]difficulty sleeping  Hematologic: [ ] easy bruising, [ ] bleeding gums       [ x] All others negative	  [ ] Unable to obtain    Vital Signs Last 24 Hrs  T(C): 36.6 (14 Nov 2023 07:54), Max: 36.6 (14 Nov 2023 07:54)  T(F): 97.9 (14 Nov 2023 07:54), Max: 97.9 (14 Nov 2023 07:54)  HR: 63 (14 Nov 2023 07:54) (63 - 63)  BP: 152/77 (14 Nov 2023 07:54) (152/77 - 152/77)  RR: 15 (14 Nov 2023 07:54) (15 - 15)  SpO2: 100% (14 Nov 2023 07:54) (100% - 100%)    Parameters below as of 14 Nov 2023 07:54  Patient On (Oxygen Delivery Method): room air      I&O's Summary      PHYSICAL EXAM:  General: No acute distress BMI-30  HEENT: EOMI, PERRL[ ] Icteric  Neck: Supple, [ ] JVD  Lungs: Equal air entry bilaterally; [ ] Rales [ ] Rhonchi [ ] Wheezing  Heart: Regular rate and rhythm;[x] Murmurs-  2 /6 [x ] Systolic [ ] Diastolic [ ] Radiation,No rubs, or gallops  Abdomen: Nontender, bowel sounds present  Extremities: No clubbing, cyanosis, [ ] edema[ ] Calf tenderness  Nervous system:  Alert & Oriented X3, no focal deficits  Psychiatric: Normal affect  Skin: No rashes or lesions      LABS:       MR:- [id number] :  NAME:-SAURABH SKELTONH:-    DATE OF SERVICE:11-14-23 @ 09:20    Patient was seen,examined and evaluated  by Mike White MD [id number] @ 09:20 .  ER evaluation, Labs and Hospital course was reviewed,    CHIEF COMPLAINT:LUE pain    HPI:50-year-old male with history of HTN, diabetes on metformin presents to the ED with left arm pain X 1 day.  Patient says that the left arm pain is radiating up to his shoulder his chest and back.  Patient denies any N/V, shortness of breath, palpitation, fevers, cough, chills.    Patient had an elevated CT calcium score yesterday and was told by his cardiologist, Dr. Mike White to come in to get a cardiac cath.  Hx Tabacco use    CARDIAC HISTORY:  [ ] CAD [ [PCI [ ] CABG [ ] Prior Cath  [ ] Atrial Fibrillation  Devices[ ] PPM [ ] ICD [ ]ILR  Heart Failure [ ] HFrEF [ ] HFpEF    PAST MEDICAL & SURGICAL HISTORY:  Hypertension      Diabetes      Varicose veins      H/O nose disorder      Varicose veins of lower extremity with pain    Home Medications:   * Patient Currently Takes Medications as of 14-Nov-2023 11:07 documented in Structured Notes  · 	tetracycline 500 mg oral tablet: Last Dose Taken:  , orally 4 times a day  · 	metroNIDAZOLE 250 mg oral tablet: Last Dose Taken:  , 1 tab(s) orally 4 times a day  · 	LORazepam 0.5 mg oral tablet: Last Dose Taken:  , 1 tab(s) orally once a day (at bedtime)  · 	aspirin 81 mg oral tablet: Last Dose Taken:  , 1 tab(s) orally once a day  · 	omeprazole 20 mg oral delayed release tablet: Last Dose Taken:  , 1 tab(s) orally 2 times a day  · 	amLODIPine 5 mg oral tablet: Last Dose Taken:  , 1 tab(s) orally once a day  · 	metFORMIN 1000 mg oral tablet: Last Dose Taken:  , 1 tab(s) orally 2 times a day  · 	losartan-hydroCHLOROthiazide 100 mg-12.5 mg oral tablet: Last Dose Taken:  , 1 tab(s) orally once a day  · 	atorvastatin 20 mg oral tablet: Last Dose Taken:  , 1 tab(s) orally once a day (at bedtime)  · 	escitalopram 5 mg oral tablet: Last Dose Taken:  , 1 tab(s) orally once a day  · 	Vitamin B12: Last Dose Taken:    · 	Vitamin C: Last Dose Taken:          MEDICATIONS  (STANDING):  lidocaine   4% Patch 1 Patch Transdermal Once      FAMILY HISTORY:  Family history of deep venous thrombosis (Father)      No family history of premature coronary artery disease or sudden cardiac death    SOCIAL HISTORY:  Smoking-[x ] Active  [ ] Former [ ] Non Smoker  Alcohol-[ ] Denies [x ] Social [ ] Daily  Ilicit Drug use-[x ] Denies [ ] Active user    REVIEW OF SYSTEMS:  Constitutional: [ ] fever, [ ]weight loss, [ ]fatigue   Activity [ ] Bedbound,[x ] Ambulates [x ] Unassisted[ ] Cane/Walker [ ] Assistence.  Effort tolerance:[ ] Excellent [ ] Good [x ] Fair [ ] Poor [ ]  Eyes: [ ] visual changes  Respiratory: [ ]shortness of breath;  [ ] cough, [ ]wheezing, [ ]chills, [ ]hemoptysis  Cardiovascular: [ ] chest pain, [ ]palpitations, [ ]dizziness,  [ ]leg swelling[ ]orthopnea [ ]PND  Gastrointestinal: [ ] abdominal pain, [ ]nausea, [ ]vomiting,  [ ]diarrhea,[ ]constipation  Genitourinary: [ ] dysuria, [ ] hematuria  Neurologic: [ ] headaches [ ] tremors[ ] weakness  Skin: [ ] itching, [ ]burning, [ ] rashes  Endocrine: [ ] heat or cold intolerance  Musculoskeletal: [ ] joint pain or swelling; [ ] muscle, back, or extremity pain  Psychiatric: [ ] depression, [ ]anxiety, [ ]mood swings, or [ ]difficulty sleeping  Hematologic: [ ] easy bruising, [ ] bleeding gums       [ x] All others negative	  [ ] Unable to obtain    Vital Signs Last 24 Hrs  T(C): 36.6 (14 Nov 2023 07:54), Max: 36.6 (14 Nov 2023 07:54)  T(F): 97.9 (14 Nov 2023 07:54), Max: 97.9 (14 Nov 2023 07:54)  HR: 63 (14 Nov 2023 07:54) (63 - 63)  BP: 152/77 (14 Nov 2023 07:54) (152/77 - 152/77)  RR: 15 (14 Nov 2023 07:54) (15 - 15)  SpO2: 100% (14 Nov 2023 07:54) (100% - 100%)    Parameters below as of 14 Nov 2023 07:54  Patient On (Oxygen Delivery Method): room air      I&O's Summary      PHYSICAL EXAM:  General: No acute distress BMI-30  HEENT: EOMI, PERRL[ ] Icteric  Neck: Supple, [ ] JVD  Lungs: Equal air entry bilaterally; [ ] Rales [ ] Rhonchi [ ] Wheezing  Heart: Regular rate and rhythm;[x] Murmurs-  2 /6 [x ] Systolic [ ] Diastolic [ ] Radiation,No rubs, or gallops  Abdomen: Nontender, bowel sounds present  Extremities: No clubbing, cyanosis, [ ] edema[ ] Calf tenderness  Nervous system:  Alert & Oriented X3, no focal deficits  Psychiatric: Normal affect  Skin: No rashes or lesions      LABS:    LABS:  11-14    136  |  100  |  13  ----------------------------<  193<H>  4.1   |  24  |  0.62    Ca    8.9      14 Nov 2023 08:55    TPro  6.9  /  Alb  4.1  /  TBili  0.3  /  DBili  x   /  AST  23  /  ALT  24  /  AlkPhos  64  11-14    Creatinine Trend: 0.62<--                        15.0   9.18  )-----------( 243      ( 14 Nov 2023 08:55 )             44.9     ECG:  Sinus rhythm  No acute ST T wave abnormalities       Cardiac Catheterization (11.14.23 @ 12:14) >  Conclusions:   Moderate-severe stenosis in proximal OM1 (iFR 0.87). Mild-moderate calcific atherosclerosis in mid LAD and mid RCA.  Successful PCI to proximal OM1 with FROY for treatment of CAD (post-PCI iFR 0.97).    Recommendations:   Dual antiplatelet therapy for 6 months. Aggressive risk factor modification. Smoking cessation.

## 2023-11-14 NOTE — ED ADULT NURSE REASSESSMENT NOTE - NS ED NURSE REASSESS COMMENT FT1
pt A&ox4, awake and alert. no complains of chest pain or SOB. breathing is spontaneous and unlabored. report given to Cath lab RN. pt being transported to Cath lab with RN and EDT on [place].

## 2023-11-14 NOTE — ED PROVIDER NOTE - ATTENDING CONTRIBUTION TO CARE
I have personally performed a face to face medical and diagnostic evaluation of the patient. I have discussed with and reviewed the Resident's note and agree with the History, ROS, Physical Exam and MDM unless otherwise indicated. A brief summary of my personal evaluation and impression can be found below.    Shante FELIZ: 50M hx of HTN DM presents with a cc of L arm pain x1 day pt reports was seen by cardiologist yesterday for routine visit, developed L arm pain after having BP cuff placed. Denies numbness, tingling or loss of sensation. Denies loss of motor function. However reports had difficulty moving arm since yesterday however reports since yesterday has had increased ability to move arm, pain now radiating from L humerus to L shoulder and L chest area. No nausea no vomiting no fever. Of note pt recently had a CTC study and was told had a high Ca score, was told to come to ED for cardiac cath.     All other ROS negative, except as above and as per HPI and ROS section.    VITALS: Initial triage and subsequent vitals have been reviewed by me.  GEN APPEARANCE: Alert, non-toxic, well-appearing, NAD.  HEAD: Atraumatic.  EYES: PERRLa, EOMI, vision grossly intact.   NECK: Supple  CV: RRR, S1S2, no c/r/m/g. Cap refill <2 seconds. No bruits.   LUNGS: CTAB. No abnormal breath sounds.  ABDOMEN: Soft, NTND. No guarding or rebound.   MSK/EXT: No spinal or extremity point tenderness. No CVA ttp. Pelvis stable. No peripheral edema. Left upper extremity with soft compartments +2 radial pulse humerus tendon appears intact, mild diffuse left shoulder tenderness.  NEURO: Alert, follows commands. Weight bearing normal. Speech normal. Sensation and motor normal x4 extremities.   SKIN: Warm, dry and intact. No rash.  PSYCH: Appropriate    Plan/MDM: Exam vital signs stable nontoxic-appearing physical exam as above, DDx concern for likely musculoskeletal related shoulder/left arm pain, however patient is also sent in for cardiac cath, will discuss with patient's doctors to arrange for inpatient cath, check basic labs cardiac enzymes EKG x-ray meds as needed reassess will arrange for admission.

## 2023-11-14 NOTE — DISCHARGE NOTE PROVIDER - CARE PROVIDER_API CALL
Mike Whtie  Cardiology  [address] [place] [address]  Phone: [phone]  Fax: [phone]  Follow Up Time:

## 2023-11-14 NOTE — H&P ADULT - ASSESSMENT
The patient is a 58y Male complaining of Lt arm pain.    ACS:    High Ca score  Cardio eval appreciated  For C. Cath today    DM II:    FSSS  Hold Metformin    HTN:  Cw Losartan/HCTZ    Chronic smoker:    Smoking cessation counselling done    Dw Cardio  Dw family

## 2025-04-11 ENCOUNTER — INPATIENT (INPATIENT)
Facility: HOSPITAL | Age: 60
LOS: 0 days | Discharge: HOME | DRG: 313 | End: 2025-04-11
Attending: INTERNAL MEDICINE | Admitting: INTERNAL MEDICINE
Payer: COMMERCIAL

## 2025-04-11 ENCOUNTER — RESULT REVIEW (OUTPATIENT)
Age: 60
End: 2025-04-11

## 2025-04-11 ENCOUNTER — TRANSCRIPTION ENCOUNTER (OUTPATIENT)
Age: 60
End: 2025-04-11

## 2025-04-11 VITALS
WEIGHT: 222.67 LBS | RESPIRATION RATE: 17 BRPM | TEMPERATURE: 98 F | HEART RATE: 73 BPM | OXYGEN SATURATION: 98 % | DIASTOLIC BLOOD PRESSURE: 87 MMHG | HEIGHT: 67 IN | SYSTOLIC BLOOD PRESSURE: 136 MMHG

## 2025-04-11 VITALS
RESPIRATION RATE: 18 BRPM | SYSTOLIC BLOOD PRESSURE: 125 MMHG | HEART RATE: 64 BPM | TEMPERATURE: 98 F | OXYGEN SATURATION: 99 % | DIASTOLIC BLOOD PRESSURE: 65 MMHG

## 2025-04-11 DIAGNOSIS — Z29.9 ENCOUNTER FOR PROPHYLACTIC MEASURES, UNSPECIFIED: ICD-10-CM

## 2025-04-11 DIAGNOSIS — I10 ESSENTIAL (PRIMARY) HYPERTENSION: ICD-10-CM

## 2025-04-11 DIAGNOSIS — I25.10 ATHEROSCLEROTIC HEART DISEASE OF NATIVE CORONARY ARTERY WITHOUT ANGINA PECTORIS: ICD-10-CM

## 2025-04-11 DIAGNOSIS — R07.9 CHEST PAIN, UNSPECIFIED: ICD-10-CM

## 2025-04-11 DIAGNOSIS — E11.65 TYPE 2 DIABETES MELLITUS WITH HYPERGLYCEMIA: ICD-10-CM

## 2025-04-11 DIAGNOSIS — E78.5 HYPERLIPIDEMIA, UNSPECIFIED: ICD-10-CM

## 2025-04-11 LAB
ALBUMIN SERPL ELPH-MCNC: 3.7 G/DL — SIGNIFICANT CHANGE UP (ref 3.5–5)
ALP SERPL-CCNC: 68 U/L — SIGNIFICANT CHANGE UP (ref 40–120)
ALT FLD-CCNC: 31 U/L DA — SIGNIFICANT CHANGE UP (ref 10–60)
ANION GAP SERPL CALC-SCNC: 7 MMOL/L — SIGNIFICANT CHANGE UP (ref 5–17)
APTT BLD: 36.8 SEC — HIGH (ref 24.5–35.6)
AST SERPL-CCNC: 14 U/L — SIGNIFICANT CHANGE UP (ref 10–40)
BASOPHILS # BLD AUTO: 0.05 K/UL — SIGNIFICANT CHANGE UP (ref 0–0.2)
BASOPHILS NFR BLD AUTO: 0.6 % — SIGNIFICANT CHANGE UP (ref 0–2)
BILIRUB SERPL-MCNC: 0.6 MG/DL — SIGNIFICANT CHANGE UP (ref 0.2–1.2)
BUN SERPL-MCNC: 17 MG/DL — SIGNIFICANT CHANGE UP (ref 7–18)
CALCIUM SERPL-MCNC: 9.3 MG/DL — SIGNIFICANT CHANGE UP (ref 8.4–10.5)
CHLORIDE SERPL-SCNC: 102 MMOL/L — SIGNIFICANT CHANGE UP (ref 96–108)
CO2 SERPL-SCNC: 26 MMOL/L — SIGNIFICANT CHANGE UP (ref 22–31)
CREAT SERPL-MCNC: 0.77 MG/DL — SIGNIFICANT CHANGE UP (ref 0.5–1.3)
EGFR: 103 ML/MIN/1.73M2 — SIGNIFICANT CHANGE UP
EGFR: 103 ML/MIN/1.73M2 — SIGNIFICANT CHANGE UP
EOSINOPHIL # BLD AUTO: 0.69 K/UL — HIGH (ref 0–0.5)
EOSINOPHIL NFR BLD AUTO: 8 % — HIGH (ref 0–6)
GLUCOSE BLDC GLUCOMTR-MCNC: 162 MG/DL — HIGH (ref 70–99)
GLUCOSE BLDC GLUCOMTR-MCNC: 262 MG/DL — HIGH (ref 70–99)
GLUCOSE SERPL-MCNC: 255 MG/DL — HIGH (ref 70–99)
HCT VFR BLD CALC: 47.2 % — SIGNIFICANT CHANGE UP (ref 39–50)
HGB BLD-MCNC: 16.2 G/DL — SIGNIFICANT CHANGE UP (ref 13–17)
IMM GRANULOCYTES NFR BLD AUTO: 0.2 % — SIGNIFICANT CHANGE UP (ref 0–0.9)
INR BLD: 0.98 RATIO — SIGNIFICANT CHANGE UP (ref 0.85–1.16)
LYMPHOCYTES # BLD AUTO: 2.78 K/UL — SIGNIFICANT CHANGE UP (ref 1–3.3)
LYMPHOCYTES # BLD AUTO: 32.3 % — SIGNIFICANT CHANGE UP (ref 13–44)
MAGNESIUM SERPL-MCNC: 1.9 MG/DL — SIGNIFICANT CHANGE UP (ref 1.6–2.6)
MCHC RBC-ENTMCNC: 30.1 PG — SIGNIFICANT CHANGE UP (ref 27–34)
MCHC RBC-ENTMCNC: 34.3 G/DL — SIGNIFICANT CHANGE UP (ref 32–36)
MCV RBC AUTO: 87.6 FL — SIGNIFICANT CHANGE UP (ref 80–100)
MONOCYTES # BLD AUTO: 0.59 K/UL — SIGNIFICANT CHANGE UP (ref 0–0.9)
MONOCYTES NFR BLD AUTO: 6.9 % — SIGNIFICANT CHANGE UP (ref 2–14)
NEUTROPHILS # BLD AUTO: 4.48 K/UL — SIGNIFICANT CHANGE UP (ref 1.8–7.4)
NEUTROPHILS NFR BLD AUTO: 52 % — SIGNIFICANT CHANGE UP (ref 43–77)
NRBC BLD AUTO-RTO: 0 /100 WBCS — SIGNIFICANT CHANGE UP (ref 0–0)
NT-PROBNP SERPL-SCNC: 45 PG/ML — SIGNIFICANT CHANGE UP (ref 0–125)
PLATELET # BLD AUTO: 261 K/UL — SIGNIFICANT CHANGE UP (ref 150–400)
POTASSIUM SERPL-MCNC: 4.1 MMOL/L — SIGNIFICANT CHANGE UP (ref 3.5–5.3)
POTASSIUM SERPL-SCNC: 4.1 MMOL/L — SIGNIFICANT CHANGE UP (ref 3.5–5.3)
PROT SERPL-MCNC: 6.7 G/DL — SIGNIFICANT CHANGE UP (ref 6–8.3)
PROTHROM AB SERPL-ACNC: 11.5 SEC — SIGNIFICANT CHANGE UP (ref 9.9–13.4)
RBC # BLD: 5.39 M/UL — SIGNIFICANT CHANGE UP (ref 4.2–5.8)
RBC # FLD: 12.7 % — SIGNIFICANT CHANGE UP (ref 10.3–14.5)
SODIUM SERPL-SCNC: 135 MMOL/L — SIGNIFICANT CHANGE UP (ref 135–145)
TROPONIN I, HIGH SENSITIVITY RESULT: 6.7 NG/L — SIGNIFICANT CHANGE UP
WBC # BLD: 8.61 K/UL — SIGNIFICANT CHANGE UP (ref 3.8–10.5)
WBC # FLD AUTO: 8.61 K/UL — SIGNIFICANT CHANGE UP (ref 3.8–10.5)

## 2025-04-11 PROCEDURE — 93005 ELECTROCARDIOGRAM TRACING: CPT

## 2025-04-11 PROCEDURE — 83880 ASSAY OF NATRIURETIC PEPTIDE: CPT

## 2025-04-11 PROCEDURE — 99285 EMERGENCY DEPT VISIT HI MDM: CPT | Mod: 25

## 2025-04-11 PROCEDURE — A9502: CPT

## 2025-04-11 PROCEDURE — 80053 COMPREHEN METABOLIC PANEL: CPT

## 2025-04-11 PROCEDURE — 85610 PROTHROMBIN TIME: CPT

## 2025-04-11 PROCEDURE — 78452 HT MUSCLE IMAGE SPECT MULT: CPT | Mod: MC

## 2025-04-11 PROCEDURE — 85025 COMPLETE CBC W/AUTO DIFF WBC: CPT

## 2025-04-11 PROCEDURE — 71275 CT ANGIOGRAPHY CHEST: CPT | Mod: MC

## 2025-04-11 PROCEDURE — 99285 EMERGENCY DEPT VISIT HI MDM: CPT

## 2025-04-11 PROCEDURE — 71046 X-RAY EXAM CHEST 2 VIEWS: CPT | Mod: 26

## 2025-04-11 PROCEDURE — 71275 CT ANGIOGRAPHY CHEST: CPT | Mod: 26

## 2025-04-11 PROCEDURE — 83735 ASSAY OF MAGNESIUM: CPT

## 2025-04-11 PROCEDURE — 82962 GLUCOSE BLOOD TEST: CPT

## 2025-04-11 PROCEDURE — 71046 X-RAY EXAM CHEST 2 VIEWS: CPT

## 2025-04-11 PROCEDURE — 85730 THROMBOPLASTIN TIME PARTIAL: CPT

## 2025-04-11 PROCEDURE — 84484 ASSAY OF TROPONIN QUANT: CPT

## 2025-04-11 PROCEDURE — 93306 TTE W/DOPPLER COMPLETE: CPT

## 2025-04-11 PROCEDURE — 36415 COLL VENOUS BLD VENIPUNCTURE: CPT

## 2025-04-11 PROCEDURE — 93017 CV STRESS TEST TRACING ONLY: CPT

## 2025-04-11 RX ORDER — INSULIN LISPRO 100 U/ML
6 INJECTION, SOLUTION INTRAVENOUS; SUBCUTANEOUS
Refills: 0 | Status: DISCONTINUED | OUTPATIENT
Start: 2025-04-11 | End: 2025-04-11

## 2025-04-11 RX ORDER — AMLODIPINE BESYLATE 10 MG/1
0 TABLET ORAL
Qty: 0 | Refills: 0 | DISCHARGE

## 2025-04-11 RX ORDER — INSULIN GLARGINE-YFGN 100 [IU]/ML
20 INJECTION, SOLUTION SUBCUTANEOUS AT BEDTIME
Refills: 0 | Status: DISCONTINUED | OUTPATIENT
Start: 2025-04-11 | End: 2025-04-11

## 2025-04-11 RX ORDER — NICOTINE POLACRILEX 4 MG/1
1 GUM, CHEWING ORAL DAILY
Refills: 0 | Status: DISCONTINUED | OUTPATIENT
Start: 2025-04-11 | End: 2025-04-11

## 2025-04-11 RX ORDER — INSULIN LISPRO 100 U/ML
INJECTION, SOLUTION INTRAVENOUS; SUBCUTANEOUS AT BEDTIME
Refills: 0 | Status: DISCONTINUED | OUTPATIENT
Start: 2025-04-11 | End: 2025-04-11

## 2025-04-11 RX ORDER — INSULIN GLARGINE-YFGN 100 [IU]/ML
12 INJECTION, SOLUTION SUBCUTANEOUS AT BEDTIME
Refills: 0 | Status: DISCONTINUED | OUTPATIENT
Start: 2025-04-11 | End: 2025-04-11

## 2025-04-11 RX ORDER — METFORMIN HYDROCHLORIDE 850 MG/1
0 TABLET ORAL
Qty: 0 | Refills: 0 | DISCHARGE

## 2025-04-11 RX ORDER — LOSARTAN POTASSIUM 100 MG/1
100 TABLET, FILM COATED ORAL DAILY
Refills: 0 | Status: DISCONTINUED | OUTPATIENT
Start: 2025-04-12 | End: 2025-04-11

## 2025-04-11 RX ORDER — ACETAMINOPHEN 500 MG/5ML
650 LIQUID (ML) ORAL EVERY 6 HOURS
Refills: 0 | Status: DISCONTINUED | OUTPATIENT
Start: 2025-04-11 | End: 2025-04-11

## 2025-04-11 RX ORDER — INSULIN LISPRO 100 U/ML
INJECTION, SOLUTION INTRAVENOUS; SUBCUTANEOUS
Refills: 0 | Status: DISCONTINUED | OUTPATIENT
Start: 2025-04-11 | End: 2025-04-11

## 2025-04-11 RX ORDER — ATORVASTATIN CALCIUM 80 MG/1
40 TABLET, FILM COATED ORAL AT BEDTIME
Refills: 0 | Status: DISCONTINUED | OUTPATIENT
Start: 2025-04-11 | End: 2025-04-11

## 2025-04-11 RX ORDER — ASPIRIN 325 MG
81 TABLET ORAL DAILY
Refills: 0 | Status: DISCONTINUED | OUTPATIENT
Start: 2025-04-12 | End: 2025-04-11

## 2025-04-11 RX ORDER — AMLODIPINE BESYLATE 10 MG/1
5 TABLET ORAL DAILY
Refills: 0 | Status: DISCONTINUED | OUTPATIENT
Start: 2025-04-11 | End: 2025-04-11

## 2025-04-11 RX ORDER — ATORVASTATIN CALCIUM 80 MG/1
0 TABLET, FILM COATED ORAL
Qty: 0 | Refills: 0 | DISCHARGE

## 2025-04-11 RX ORDER — NICOTINE POLACRILEX 4 MG/1
2 GUM, CHEWING ORAL
Refills: 0 | Status: DISCONTINUED | OUTPATIENT
Start: 2025-04-11 | End: 2025-04-11

## 2025-04-11 RX ORDER — ASPIRIN 325 MG
1 TABLET ORAL
Refills: 0 | DISCHARGE

## 2025-04-11 RX ORDER — RIVAROXABAN 10 MG/1
2.5 TABLET, FILM COATED ORAL
Refills: 0 | Status: DISCONTINUED | OUTPATIENT
Start: 2025-04-11 | End: 2025-04-11

## 2025-04-11 RX ORDER — ERGOCALCIFEROL 1.25 MG/1
1 CAPSULE ORAL
Refills: 0 | DISCHARGE

## 2025-04-11 RX ORDER — LOSARTAN POTASSIUM AND HYDROCHLOROTHIAZIDE 12.5; 5 MG/1; MG/1
1 TABLET ORAL
Refills: 0 | DISCHARGE

## 2025-04-11 RX ORDER — RIVAROXABAN 10 MG/1
1 TABLET, FILM COATED ORAL
Refills: 0 | DISCHARGE

## 2025-04-11 RX ADMIN — INSULIN LISPRO 3: 100 INJECTION, SOLUTION INTRAVENOUS; SUBCUTANEOUS at 17:44

## 2025-04-11 NOTE — DISCHARGE NOTE PROVIDER - CARE PROVIDER_API CALL
Dinesh Rojas  Internal Medicine  [address] [place] [address], [address]  [place] [address]  Phone: [phone]  Fax: [phone]  Established Patient  Follow Up Time: 1-3 days

## 2025-04-11 NOTE — H&P ADULT - PROBLEM SELECTOR PLAN 2
hx of CAD s/p stent to pOM in 11/2023 at [facility] previously on DAPT now on ASA and Xarelto 2.5mg   -c/w aspirin 81mg qd  -c/w home xarelto 2.5mg qd

## 2025-04-11 NOTE — DISCHARGE NOTE PROVIDER - NSDCMRMEDTOKEN_GEN_ALL_CORE_FT
amLODIPine 5 mg oral tablet: 1 tab(s) orally once a day  ascorbic acid: 500 milligram(s) orally once a day  aspirin 81 mg oral tablet: 1 tab(s) orally once a day  atorvastatin 40 mg oral tablet: 1 tab(s) orally once a day (at bedtime)  losartan-hydroCHLOROthiazide 100 mg-12.5 mg oral tablet: 1 tab(s) orally once a day  metFORMIN 1000 mg oral tablet: 1 tab(s) orally 2 times a day  Vitamin B12:   VITAMIN D2 50,000IU CAP: 1 cap(s) orally once a week  XARELTO 2.5MG TAB: 1 tab(s) orally 2 times a day

## 2025-04-11 NOTE — CONSULT NOTE ADULT - SUBJECTIVE AND OBJECTIVE BOX
C A R D I O L O G Y  *********************    DATE OF SERVICE: 25    HISTORY OF PRESENT ILLNESS: 59y M with PMH of HTN, HLD, DM, varicose veins, and CAD s/p FROY to OM1 (2023), normal LV and RV fx known LBBB  presenting with intermittent episodes of non-radiating left upper sternal chest pain for the last 1.5 months. Patient describes pain as achy feeling like muscle pain, not associated with shortness of breath, palpitations or light-headedness. States that pain starts randomly and lasts ~ 1-2 minutes before resolving on its own. Pain occurs at rest and patient denies any association of pain onset with exertion. Endorses possible positional changes as inciting factors otherwise cannot identify consistent triggers. Only relieving factors reported are stretching arms and massaging chest. Patient states that he had been in travelling in [place] for work for the last 2 months and did experience some emotional stress related to business over that time. Reports recent episode of lightheadedness and shortness of breath when bending over to  grandson. Denies orthopnea reporting sleeping with one pillow at night. Reports recent right calf cramping pain worse at night but denies leg swelling, numbness, or skin changes, Reports that he has been more lethargic and sedentary the last 6 months. Reports recent hemoglobin A1c increased to 11 from 7 despite medication compliance and no changes in diet.  Endorses increased urinary frequency but denies dysuria. Denies fever, chills, headache, dizziness, palpitations, shortness of breath, LOC abdominal pain, nausea, vomiting, diarrhea, constipation, and dysuria. at this time      In the ED:  T(F): , Max: 98.2 (09:07); HR:  (73 - 73); BP:  (136/87 - 136/87); RR:  (17 - 17); SpO2:  (98% - 98%)  WBC:8.61, Hb.2, PLT:261  Na:135, K:4.1, Cl:102, HCO3:26, BUN:17, sCr:0.77  AST:14, ALT:31, ALP:68, Tbili:0.6  Troponin:6.7, p-BNP:45  s/p  (2025 11:22)      PAST MEDICAL & SURGICAL HISTORY:  Hypertension  Diabetes  Varicose veins  H/O nose disorder  Varicose veins of lower extremity with pain          MEDICATIONS:  MEDICATIONS  (STANDING):  amLODIPine   Tablet 5 milliGRAM(s) Oral daily  atorvastatin 40 milliGRAM(s) Oral at bedtime  insulin glargine Injectable (LANTUS) 12 Unit(s) SubCutaneous at bedtime  insulin lispro (ADMELOG) corrective regimen sliding scale   SubCutaneous three times a day before meals  insulin lispro (ADMELOG) corrective regimen sliding scale   SubCutaneous at bedtime  rivaroxaban 2.5 milliGRAM(s) Oral two times a day      Allergies    No Known Allergies    Intolerances        FAMILY HISTORY:  Family history of deep venous thrombosis (Father)      Non-contributary for premature coronary disease or sudden cardiac death    SOCIAL HISTORY:    [ ] Non-smoker  [X ] Smoker  [ ] Alcohol        REVIEW OF SYSTEMS:  [ ]chest pain  [  ]shortness of breath  [  ]palpitations  [  ]syncope  [ ]near syncope [ ]upper extremity weakness   [ ] lower extremity weakness  [  ]diplopia  [  ]altered mental status   [  ]fevers  [ ]chills [ ]nausea  [ ]vomitting  [  ]dysphagia    [ ]abdominal pain  [ ]melena  [ ]BRBPR    [  ]epistaxis  [  ]rash    [ ]lower extremity edema        [X] All others negative	  [ ] Unable to obtain      LABS:	 	    CARDIAC MARKERS:        Troponin I, High Sensitivity Result: 6.7 ng/L (25 @ 09:56)                            16.2   8.61  )-----------( 261      ( 2025 09:56 )             47.2     Hb Trend: 16.2<--        135  |  102  |  17  ----------------------------<  255[H]  4.1   |  26  |  0.77    Ca    9.3      2025 09:56  Mg     1.9         TPro  6.7  /  Alb  3.7  /  TBili  0.6  /  DBili  x   /  AST  14  /  ALT  31  /  AlkPhos  68      Creatinine Trend: 0.77<--    Coags:  PT/INR - ( 2025 09:56 )   PT: 11.5 sec;   INR: 0.98 ratio         PTT - ( 2025 09:56 )  PTT:36.8 sec          PHYSICAL EXAM:  T(C): 36.6 (25 @ 11:57), Max: 36.8 (25 @ 09:07)  HR: 59 (25 @ 11:57) (59 - 73)  BP: 138/85 (25 @ 11:57) (136/87 - 138/85)  RR: 18 (25 @ 11:57) (17 - 18)  SpO2: 97% (25 @ 11:57) (97% - 98%)  Wt(kg): --   BMI (kg/m2): 34.9 (25 @ 09:07)  I&O's Summary      HEENT:  (-)icterus (-)pallor  CV: N S1 S2 1/6 SMOOTH (+)2 Pulses B/l  Resp:  Clear to ausculatation B/L, normal effort  GI: (+) BS Soft, NT, ND  Lymph:  (-)Edema, (-)obvious lymphadenopathy  Skin: Warm to touch, Normal turgor  Psych: Appropriate mood and affect      ECG:  	Sinus LBBB      ASSESSMENT/PLAN: 	59y Male PMH of HTN, HLD, DM, varicose veins, and CAD s/p FROY to OM1 (2023), normal LV and RV fx known LBBB  presenting with intermittent episodes of non-radiating left upper sternal chest pain for the last 1.5 months.     # CP  - f/u second set of troponin  - echo  - if echo wnl plan for pharm nuclear stress test    # CAD  - cont xarelto 2.5 mg PO daily and asa   - cont statin  - Smoking cessation stressed     I once again thank you for allowing me to participate in the care of your patient.  If you have any questions or concerns please do not hesitate to contact me.    Mj Miranda MD, [facility]  BEEPER [phone]

## 2025-04-11 NOTE — H&P ADULT - PROBLEM SELECTOR PLAN 1
p/w intermittent episodes of non-radiating left upper sternal chest pain for the last 1.5 months  -EKG shows sinus rhythm with LBBB (similar to prior EKGs)  -Troponin WNL,    -pro-BNP WNL,  -f/u repeat troponin, trend to peak with serial EKGs  -Admit to telemetry  -c/w home aspirin 81mg qd, atorvastatin 40mg qhs  -f/u TTE  -f/u nuclear stress test  -GEE score:  -HEART score:  -Cardiology: Dr. White

## 2025-04-11 NOTE — DISCHARGE NOTE NURSING/CASE MANAGEMENT/SOCIAL WORK - PATIENT PORTAL LINK FT
You can access the FollowMyHealth Patient Portal offered by [facility] by registering at the following website: [email or web address]. By joining [employer]’s FollowMyHealth portal, you will also be able to view your health information using other applications (apps) compatible with our system.

## 2025-04-11 NOTE — DISCHARGE NOTE NURSING/CASE MANAGEMENT/SOCIAL WORK - FINANCIAL ASSISTANCE
[facility] provides services at a reduced cost to those who are determined to be eligible through [facility]’s financial assistance program. Information regarding [facility]’s financial assistance program can be found by going to [email or web address] or by calling [phone].

## 2025-04-11 NOTE — DISCHARGE NOTE PROVIDER - NSDCFUSCHEDAPPT_GEN_ALL_CORE_FT
Tayla Hobbs  [facility]  VASCULAR [address]  Scheduled Appointment: 04/14/2025    [facility]  VASCULAR [address]  Scheduled Appointment: 04/14/2025

## 2025-04-11 NOTE — DISCHARGE NOTE PROVIDER - NSDCCPCAREPLAN_GEN_ALL_CORE_FT
PRINCIPAL DISCHARGE DIAGNOSIS  Diagnosis: Chest pain  Assessment and Plan of Treatment: You presented to the ED concerned of left sternal aching pain occuring on and off for the last 1.5 months. You were admitted to rule out cardiac source of chest pain. Your EKG showed normal sinus rhythm with left bundle branch block similar to prior EKG. Lab markers for to check for cardiac stress (e.g. troponin, p-BNP) were normal. An echocardiogram to assess the structure and pumping function of the heart showed abnormal septal motion consistent with left bundle branch block but no regional wall abnormalities to suggest acute ischemia or infarction. Your ejection fraction was normal at 67% which is a measure of how efficiently your heart is pumping blood out to the body. Your echocardiogram also showed grade 1 diastolic dysfunction which means that there is a mild impairment of the heart muscle's ability to relax. which is when the heart is filled with blood. A nuclear stress test to assess for impaired blood flow and oxygen delivery to heart when the heart is working harder was normal. Ultimately a CT angiogram of the chest was performed to rule out a blood clot in the lungs (i.e. pulmonary embolism) as a possible cause of your chest pain. There was no evidence of pulmonary embolism. In all work-up showed no evidence to suggest a cardiac source of your chest pain. Please continue to take your medications as PRESCRIBED and follow up with your primary care doctor in a week from discharge to adjust medications as needed and discuss further management.      SECONDARY DISCHARGE DIAGNOSES  Diagnosis: Uncontrolled type 2 diabetes mellitus with hyperglycemia  Assessment and Plan of Treatment: You have a history of diabetes and presented to the hospital with recent outpatient lab work showing your hemoglobin A1c had increased to 11.2.  While in the hospital your sugar was montiored and controlled with a sliding scale of insulin. Based on your high A1c you should likely start on long acting insulin at night time and potentially pre-meal short acting insulin to better control your blood sugar. Given you have never been on insulin it was determined it would be safer for you to start insulin under the supervision of your primary care doctor or endocrinologist after discharge to avoid potentially dangerously low blood sugar levels.  You should continue monitoring your blood sugar levels closely and maintain healthy lifestyle by eating healthy diabetic regimen. Try to lose weight loss and exercise regularly as tolerated.  Please continue to take your current medications as prescribed.   Please follow up with your PCP/Endocrinologist within a week of discharge to discuss further management including initating an insulin regimen.      Diagnosis: HTN (hypertension)  Assessment and Plan of Treatment: You have a history of hypertension on antihypertensive medications at home. While in the hospital your blood pressure was adequately controlled.  You should follow-up with your PCP within 1 week of your discharge for routine blood pressure monitoring at your next visit. Notify your doctor if you have any of the following symptoms: (Dizziness, Lightheadedness, Blurry vision, Headache, Chest pain, Shortness of breath.) You should maintain healthy lifestyle by eating healthy, avoiding fatty food, and exercising regularly as tolerated 30 mins X 3 time per week Please continue to take your medications as PRESCRIBED and follow up with your primary care doctor in a week from discharge to adjust medications as needed and discuss further management.    Diagnosis: CAD (coronary artery disease)  Assessment and Plan of Treatment: You have a history of coronary artery disease. Cardiac work-up was as above. Please continue to take your medications as PRESCRIBED and follow up with your primary care doctor in a week from discharge to adjust medications as needed and discuss further management.

## 2025-04-11 NOTE — ED ADULT NURSE NOTE - OBJECTIVE STATEMENT
Pt presents to ED c/o chest pain on and off x 1 month. Patient has history of stents taking xarelto, HTN, DM. Patient denies any SOB, dizziness, cough, fever

## 2025-04-11 NOTE — DISCHARGE NOTE PROVIDER - CARE PROVIDERS DIRECT ADDRESSES
[email or web address] Detail Level: Simple Photo Preface (Leave Blank If You Do Not Want): Photographs were obtained today

## 2025-04-11 NOTE — ED PROVIDER NOTE - CLINICAL SUMMARY MEDICAL DECISION MAKING FREE TEXT BOX
59-year-old male with intermittent chest pain.  PE as above.    EKG, labs, admission for stress test.

## 2025-04-11 NOTE — H&P ADULT - ASSESSMENT
59y M with PMH of HTN, HLD, DM, varicose veins, and CAD s/p 1 stent (11/2023) presenting with intermittent episodes of non-radiating left upper sternal chest pain for the last 1.5 months. Admitted to telemetry to rule out ACS.

## 2025-04-11 NOTE — H&P ADULT - HISTORY OF PRESENT ILLNESS
59y M with PMH of HTN, HLD, DM, varicose veins, and CAD s/p 1 stent (2023) presenting with intermittent episodes of non-radiating left upper sternal chest pain for the last 1.5 months. Patient describes pain as achy feeling like muscle pain, not associated with shortness of breath, palpitations or light-headedness. States that pain starts randomly and lasts ~ 1-2 minutes before resolving on its own. Pain occurs at rest and patient denies any association of pain onset with exertion. Endorses possible positional changes as inciting factors otherwise cannot identify consistent triggers. Only relieving factors reported are stretching arms and massaging chest. Patient states that he had been in travelling in [place] for work for the last 2 months and did experience some emotional stress related to business over that time. Reports recent episode of lightheadedness and shortness of breath when bending over to  grandson. Denies orthopnea reporting sleeping with one pillow at night. Reports recent right calf cramping pain worse at night but denies leg swelling, numbness, or skin changes, Reports that he has been more lethargic and sedentary the last 6 months. Reports recent hemoglobin A1c increased to 11 from 7 despite medication compliance and no changes in diet.  Endorses increased urinary frequency but denies dysuria. Denies fever, chills, headache, dizziness, palpitations, shortness of breath, abdominal pain, nausea, vomiting, diarrhea, constipation, and dysuria.      In the ED:  T(F): , Max: 98.2 (09:07); HR:  (73 - 73); BP:  (136/87 - 136/87); RR:  (17 - 17); SpO2:  (98% - 98%)  WBC:8.61, Hb.2, PLT:261  Na:135, K:4.1, Cl:102, HCO3:26, BUN:17, sCr:0.77  AST:14, ALT:31, ALP:68, Tbili:0.6  Troponin:6.7, p-BNP:45  s/p  59y M with PMH of HTN, HLD, DM, varicose veins, and CAD s/p 1 stent (2023) presenting with intermittent episodes of non-radiating left upper sternal chest pain for the last 1.5 months. Patient describes pain as achy feeling like muscle pain, not associated with shortness of breath, palpitations or light-headedness. States that pain starts randomly and lasts ~ 1-2 minutes before resolving on its own. Pain occurs at rest and patient denies any association of pain onset with exertion. Endorses possible positional changes as inciting factors otherwise cannot identify consistent triggers. Only relieving factors reported are stretching arms and massaging chest. Patient states that he had been in travelling in [place] for work for the last 2 months and did experience some emotional stress related to business over that time. Reports recent episode of lightheadedness and shortness of breath when bending over to  grandson. Denies orthopnea reporting sleeping with one pillow at night. Reports recent right calf cramping pain worse at night but denies leg swelling, numbness, or skin changes, Reports that he has been more lethargic and sedentary the last 6 months. Reports recent hemoglobin A1c increased to 11 from 7 despite medication compliance and no changes in diet.  Endorses increased urinary frequency but denies dysuria. Denies fever, chills, headache, dizziness, palpitations, shortness of breath, abdominal pain, nausea, vomiting, diarrhea, constipation, and dysuria.      In the ED:  T(F): , Max: 98.2 (09:07); HR:  (73 - 73); BP:  (136/87 - 136/87); RR:  (17 - 17); SpO2:  (98% - 98%)  WBC:8.61, Hb.2, PLT:261  Na:135, K:4.1, Cl:102, HCO3:26, BUN:17, sCr:0.77  AST:14, ALT:31, ALP:68, Tbili:0.6  Troponin:6.7, p-BNP:45

## 2025-04-11 NOTE — ED ADULT NURSE NOTE - NSFALLUNIVINTERV_ED_ALL_ED
Bed/Stretcher in lowest position, wheels locked, appropriate side rails in place/Call bell, personal items and telephone in reach/Instruct patient to call for assistance before getting out of bed/chair/stretcher/Non-slip footwear applied when patient is off stretcher/[place]cdaniel to call system/Physically safe environment - no spills, clutter or unnecessary equipment/Purposeful proactive rounding/Room/bathroom lighting operational, light cord in reach

## 2025-04-11 NOTE — H&P ADULT - NSICDXPASTMEDICALHX_GEN_ALL_CORE_FT
PAST MEDICAL HISTORY:  CAD S/P percutaneous coronary angioplasty     Diabetes     DM (diabetes mellitus)     Hypertension     Varicose veins

## 2025-04-11 NOTE — H&P ADULT - PROBLEM SELECTOR PLAN 3
h/o DM on metformin 1000mg BID  - hold home oral dm meds  -serum glucose 255 on admission  -last A1c 11 on 4/7/25 outpatient labs   -f/u A1c  -start lantus 20U qhs, Lispro 6U tid ac and  low dose sliding scale achs  -Adjust insulin as indicated  -fingersticks ACHS  -endocrinology consult: Dr. Leon h/o DM on metformin 1000mg BID  - hold home oral dm meds  -serum glucose 255 on admission  -last A1c 11 on 4/7/25 outpatient labs   -f/u A1c  -start lantus 12U qhs and  low dose sliding scale achs  -Adjust insulin as indicated  -fingersticks ACHS

## 2025-04-11 NOTE — H&P ADULT - NSHPPHYSICALEXAM_GEN_ALL_CORE
LOS:     VITALS:   T(C): 36.7 (04-11-25 @ 14:45), Max: 36.8 (04-11-25 @ 09:07)  HR: 64 (04-11-25 @ 14:45) (59 - 73)  BP: 125/65 (04-11-25 @ 14:45) (125/65 - 138/85)  RR: 18 (04-11-25 @ 14:45) (17 - 18)  SpO2: 99% (04-11-25 @ 14:45) (97% - 99%)    GENERAL: NAD, lying in bed comfortably  HEAD:  Atraumatic, Normocephalic  EYES: EOMI, PERRLA, conjunctiva and sclera clear  ENT: Moist mucous membranes  NECK: Supple, No JVD  CHEST/LUNG: Clear to auscultation bilaterally; No rales, rhonchi, wheezing, or rubs. Unlabored respirations  HEART: Regular rate and rhythm; No murmurs, rubs, or gallops  ABDOMEN: BSx4; Soft, nontender, nondistended  EXTREMITIES:  2+ Peripheral Pulses, brisk capillary refill. No clubbing, cyanosis, or edema, b/l LE varicosities   NERVOUS SYSTEM:  A&Ox3, no focal deficits   SKIN: No rashes or lesions

## 2025-04-11 NOTE — ED PROVIDER NOTE - OBJECTIVE STATEMENT
59-year-old male with past medical history of CAD with 1 stent (placed in November 2023), diabetes, smoker, on Xarelto for his heart presents with intermittent episodes of substernal left-sided nonradiating chest pain over the past month.  Patient  comes in episodes that last approximately for 1 minute and then will resolve.  Patient  was traveling for the past 2 months in [place] so now that he is back he did follow-up with his primary care doctor.   he did have blood work done at the office this week and was told that his hemoglobin A1c had increased from 7 to 11%.  Patient  was told the remainder of his blood work was unremarkable.  Patient  he did speak with his cardiologist Dr. White who advised to come to the hospital for stress test.

## 2025-04-11 NOTE — H&P ADULT - PROBLEM SELECTOR PLAN 4
h/o HTN on losartan-HCTZ 100mg-12.5mg qd  -c/w home meds with parameters  -monitor BP  -adjust antihypertensive meds as appropriate  -DASH diet

## 2025-04-11 NOTE — ED PROVIDER NOTE - PROGRESS NOTE DETAILS
EKG is unchanged from prior.  I spoke with the patient's cardiologist who advises admission for nuclear stress test.  Will admit.

## 2025-04-11 NOTE — DISCHARGE NOTE PROVIDER - HOSPITAL COURSE
59y M with PMH of HTN, HLD, DM, varicose veins, and CAD s/p 1 stent (11/2023) presenting with intermittent episodes of non-radiating left upper sternal chest pain for the last 1.5 months. Patient describes pain as achy feeling like muscle pain, not associated with shortness of breath, palpitations or light-headedness. States that pain starts randomly and lasts ~ 1-2 minutes before resolving on its own. Pain occurs at rest and patient denies any association of pain onset with exertion. Endorses possible positional changes as inciting factors otherwise cannot identify consistent triggers. Only relieving factors reported are stretching arms and massaging chest. Patient states that he had been in travelling in [place] for work for the last 2 months and did experience some emotional stress related to business over that time. Reports recent episode of lightheadedness and shortness of breath when bending over to  grandson. Denies orthopnea reporting sleeping with one pillow at night. Reports recent right calf cramping pain worse at night but denies leg swelling, numbness, or skin changes, Reports that he has been more lethargic and sedentary the last 6 months. Reports recent hemoglobin A1c increased to 11 from 7 despite medication compliance and no changes in diet.  Endorses increased urinary frequency but denies dysuria. Denied fever, chills, headache, dizziness, palpitations, shortness of breath, abdominal pain, nausea, vomiting, diarrhea, constipation, and dysuria.    Admitted to telemetry to r/o ACS. EKG showed sinus rhythm with LBBB similar to prior EKGs. Troponin and p-BNP were both WNL. TTE showed 1. Abnormal septal motion consistent with left bundle branch block. Left ventricular systolic function is normal with an ejection fraction of 62 % by Montejo's method of disks. 2. There is normal LV mass and concentric remodeling. 3. There is mild (grade 1) left ventricular diastolic dysfunction.4. Normal right ventricular cavity size and normal right ventricular systolic function.    Pharmacologic Nuclears tress test showed  1. Normal myocardial perfusion scan, with no evidence of infarction or inducible ischemia.2. Electrocardiogram ischemic changes: indeterminant EKG evidence of ischemia due to LBBB. 3. QualitativePerfusion: - small-sized, mild defect(s) in the anteroseptal wall that is fixed consistent with prominent RV insertion. 4. The left ventricle is normal in function and normal in size. The post stress left ventricular EF is 64 %. 5. Normal left ventricular regional wall motion.    Cardiology, Dr. Miranda, recommended CTA chest to rule out PE as possible source of chest pain given recent long distance travel, remote history of calf pain, and LE varicosities. CTA chest was negative for PE.     Patient is stable for discharge and is advised to follow up with PCP as outpatient.

## 2025-04-14 ENCOUNTER — APPOINTMENT (OUTPATIENT)
Dept: VASCULAR SURGERY | Facility: CLINIC | Age: 60
End: 2025-04-14
Payer: COMMERCIAL

## 2025-04-14 VITALS
WEIGHT: 222 LBS | SYSTOLIC BLOOD PRESSURE: 112 MMHG | TEMPERATURE: 97.7 F | HEART RATE: 87 BPM | BODY MASS INDEX: 31.08 KG/M2 | DIASTOLIC BLOOD PRESSURE: 78 MMHG | HEIGHT: 71 IN

## 2025-04-14 DIAGNOSIS — I83.893 VARICOSE VEINS OF BILATERAL LOWER EXTREMITIES WITH OTHER COMPLICATIONS: ICD-10-CM

## 2025-04-14 DIAGNOSIS — I87.2 VENOUS INSUFFICIENCY (CHRONIC) (PERIPHERAL): ICD-10-CM

## 2025-04-14 PROCEDURE — 99213 OFFICE O/P EST LOW 20 MIN: CPT

## 2025-04-14 PROCEDURE — 93970 EXTREMITY STUDY: CPT

## 2025-04-15 PROBLEM — E11.9 TYPE 2 DIABETES MELLITUS WITHOUT COMPLICATIONS: Chronic | Status: ACTIVE | Noted: 2025-04-11

## 2025-04-15 PROBLEM — I25.10 ATHEROSCLEROTIC HEART DISEASE OF NATIVE CORONARY ARTERY WITHOUT ANGINA PECTORIS: Chronic | Status: ACTIVE | Noted: 2025-04-11

## 2025-04-17 PROBLEM — I83.893 VARICOSE VEINS OF BILATERAL LOWER EXTREMITIES WITH OTHER COMPLICATIONS: Status: ACTIVE | Noted: 2025-04-17

## 2025-04-22 NOTE — DISCHARGE NOTE PROVIDER - ATTENDING ATTESTATION STATEMENT
Spontaneous, unlabored and symmetrical I have personally seen and examined the patient. I have collaborated with and supervised the